# Patient Record
Sex: FEMALE | Race: WHITE
[De-identification: names, ages, dates, MRNs, and addresses within clinical notes are randomized per-mention and may not be internally consistent; named-entity substitution may affect disease eponyms.]

---

## 2022-08-26 ENCOUNTER — HOSPITAL ENCOUNTER (EMERGENCY)
Dept: HOSPITAL 26 - MED | Age: 22
Discharge: HOME | End: 2022-08-26
Payer: SELF-PAY

## 2022-08-26 VITALS — BODY MASS INDEX: 20.77 KG/M2 | WEIGHT: 110 LBS | HEIGHT: 61 IN

## 2022-08-26 VITALS — DIASTOLIC BLOOD PRESSURE: 80 MMHG | SYSTOLIC BLOOD PRESSURE: 124 MMHG

## 2022-08-26 VITALS — SYSTOLIC BLOOD PRESSURE: 122 MMHG | DIASTOLIC BLOOD PRESSURE: 70 MMHG

## 2022-08-26 DIAGNOSIS — T40.411A: Primary | ICD-10-CM
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
## 2022-08-26 DIAGNOSIS — F12.90: ICD-10-CM

## 2022-08-26 DIAGNOSIS — F17.200: ICD-10-CM

## 2022-08-26 NOTE — NUR
PT OVERDOSED ON METH AND FENTANYL. GIVEN 4MG NARCAN INH AND 0.5 IVP BY MEDICS 
PTA. PT AOX4. STACH ON MONITOR. 99% RA

## 2022-09-26 NOTE — NUR
Patient discharged with v/s stable. Written and verbal after care instructions 
given and explained. 

Patient verbalized understanding. with Police in custody. All questions 
addressed prior to discharge. Advised to follow up with PMD.

## 2022-09-26 NOTE — NUR
22 y/o female bib pd, pt presents to ed with open blisters on body with 
itching. 6/10 pain on bl arms and legs. 



pmh: denies

nka

med: denies

## 2022-10-05 NOTE — NUR
PT BIB ALS RUN FOUND UNRESPONSIVE, FRIEND STATES USED FENTANYL, GIVEN 2MG 
INTRANASAL, PT GCS 15. STACH ON MONITOR. BREATHING UNLABORED. 100% RA.

## 2022-11-02 NOTE — NUR
BIBA FOR OD ON FENTANYL, PT WAS UNRESPONSIVE, [place] ADMINISTERED 8 MG 
NARCAN IN, PT A/O X4, SPEAKING IN COMPLETE SENTENCES, PT PLACED ON CARDAIC 
MONITOR, DR KOWALSKI AT BEDSIDE EVALUATING PT. HX- ADHD

## 2022-11-03 NOTE — NUR
PT RESTING, NO RESPIRATORY DISTRESS OBSERVED. PT O2 MAINTAIN ABOVE 97%RA AND 
UNLABORED BREATING WITH RATE ABOVE 16.